# Patient Record
Sex: FEMALE | Race: BLACK OR AFRICAN AMERICAN | URBAN - METROPOLITAN AREA
[De-identification: names, ages, dates, MRNs, and addresses within clinical notes are randomized per-mention and may not be internally consistent; named-entity substitution may affect disease eponyms.]

---
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
## 2022-09-08 NOTE — PROCEDURE: MEDICATION COUNSELING
Doxycycline Counseling:  Patient counseled regarding possible photosensitivity and increased risk for sunburn.  Patient instructed to avoid sunlight, if possible.  When exposed to sunlight, patients should wear protective clothing, sunglasses, and sunscreen.  The patient was instructed to call the office immediately if the following severe adverse effects occur:  hearing changes, easy bruising/bleeding, severe headache, or vision changes.  The patient verbalized understanding of the proper use and possible adverse effects of doxycycline.  All of the patient's questions and concerns were addressed. Pt states around 1030 this morning, he noticed he "could not get the words out" and had unsteady gait and had to sit down. Symptoms resolved on their own and then the dizziness and facial droop returned about an hour ago. Pt denies headache, numbness/tingling, vision changes.

## 2024-04-09 NOTE — PROCEDURE: MEDICATION COUNSELING
[FreeTextEntry1] : Interval course: Patient still has vertigo that especially happens when she turns her  head or during the exam when she vlosed her eyes and walked. she attempted vestibular therapy but it does not appear that it was done correctly.     HPI: CC follow up of stroke and vertigo.  patient c/o increasing frequency of vertiginous symptoms that occurs when she stands up, or turns but can also occur when she is sitting. sent for an MRI brain that showed no evidence of new stroke.  h/o cerebellar stroke.   Also has episodes when the room closes in and lights seem dim especially when she is walking for awhile.    Tremfya Counseling: I discussed with the patient the risks of guselkumab including but not limited to immunosuppression, serious infections, worsening of inflammatory bowel disease and drug reactions.  The patient understands that monitoring is required including a PPD at baseline and must alert us or the primary physician if symptoms of infection or other concerning signs are noted.
